# Patient Record
Sex: FEMALE | Race: WHITE | NOT HISPANIC OR LATINO | Employment: FULL TIME | URBAN - METROPOLITAN AREA
[De-identification: names, ages, dates, MRNs, and addresses within clinical notes are randomized per-mention and may not be internally consistent; named-entity substitution may affect disease eponyms.]

---

## 2018-08-27 ENCOUNTER — OFFICE VISIT - RIVER FALLS (OUTPATIENT)
Dept: FAMILY MEDICINE | Facility: CLINIC | Age: 48
End: 2018-08-27

## 2018-11-06 ENCOUNTER — AMBULATORY - RIVER FALLS (OUTPATIENT)
Dept: FAMILY MEDICINE | Facility: CLINIC | Age: 48
End: 2018-11-06

## 2019-11-05 ENCOUNTER — AMBULATORY - RIVER FALLS (OUTPATIENT)
Dept: FAMILY MEDICINE | Facility: CLINIC | Age: 49
End: 2019-11-05

## 2021-05-29 ENCOUNTER — RECORDS - HEALTHEAST (OUTPATIENT)
Dept: ADMINISTRATIVE | Facility: CLINIC | Age: 51
End: 2021-05-29

## 2021-12-16 ENCOUNTER — OFFICE VISIT - RIVER FALLS (OUTPATIENT)
Dept: FAMILY MEDICINE | Facility: CLINIC | Age: 51
End: 2021-12-16

## 2021-12-16 ASSESSMENT — MIFFLIN-ST. JEOR: SCORE: 1377.02

## 2022-02-11 VITALS
SYSTOLIC BLOOD PRESSURE: 135 MMHG | HEART RATE: 73 BPM | BODY MASS INDEX: 29.06 KG/M2 | WEIGHT: 170.2 LBS | DIASTOLIC BLOOD PRESSURE: 91 MMHG | HEIGHT: 64 IN | TEMPERATURE: 97.8 F

## 2022-02-12 VITALS
HEART RATE: 60 BPM | DIASTOLIC BLOOD PRESSURE: 80 MMHG | TEMPERATURE: 98.5 F | BODY MASS INDEX: 27.26 KG/M2 | SYSTOLIC BLOOD PRESSURE: 120 MMHG | WEIGHT: 158.8 LBS

## 2022-02-16 NOTE — PROGRESS NOTES
Chief Complaint    c/o right side sinus pain/pressure x2wks.  also bloody nasal discharge when blowing nose  History of Present Illness       Susan is here with a 2 week h/o right sided nasal and sinus congestion.       No f/c/s       Has h/o sinusitis       She reports ongoing problems with hand eczema and his mother posterior scalp.  She has used various topical steroids in the past.  Review of Systems          ROS reviewed and negative except for symptoms noted in HPI.  Physical Exam   Vitals & Measurements    T: 97.8  F (Tympanic)  HR: 73 (Peripheral)  BP: 135/91     HT: 64 in  WT: 170.2 lb  BMI: 29.21           General:  Alert and oriented, No acute distress.            Eye:  Normal conjunctiva.            HENT:  Normocephalic, Tympanic membranes are clear, Oral mucosa is moist, No pharyngeal erythema.                 Sinus: left maxillary tenderness               Throat: Pharynx ( posterior drainage ).            Neck:  Supple, Non-tender, No lymphadenopathy.            Respiratory:  Lungs are clear to auscultation, Respirations are non-labored.            Cardiovascular:  Normal rate, Regular rhythm.            Integumentary:  Warm, Dry.  Eczematous changes of the palms and posterior neck and scalp          Psychiatric:  Cooperative, Appropriate mood & affect.   Assessment/Plan       1. Acute maxillary sinusitis, unspecified (J01.00)          Symptoms have been persistent over the last 2 weeks.  Treat with amoxicillin clavulanate 875 mg twice daily for 10 days, symptomatic care, follow-up if not improving       2. Chronic eczema of hand (L30.9)          Not improving with current treatment         Clobetasol cream applied twice daily with occlusion at night, follow-up if not improving       3. Eczema of scalp (L30.9)          Clobetasol solution applied twice daily, follow-up if not improving       Orders:         amoxicillin-clavulanate, = 1 tab(s), Oral, q12 hrs, x 10 day(s), # 20 tab(s), 0 Refill(s), Type:  Acute, Pharmacy: [facility], 1 tab(s) Oral q12 hrs,x10 day(s), 64, in, 21 11:50:00 CST, Height Measured, 170.2, lb, 21 11:50:00 CST, Weight Measured, (Ordered)         clobetasol topical, 1 don, Topical, bid, # 50 mL, 1 Refill(s), Type: Maintenance, Pharmacy: [facility], 1 don Topical bid, 64, in, 21 11:50:00 CST, Height Measured, 170.2, lb, 21 11:50:00 CST, Weight Measured, (Ordered)         clobetasol topical, 1 don, Topical, bid, # 45 gm, 1 Refill(s), Type: Maintenance, Pharmacy: [facility], 1 don Topical bid, 64, in, 21 11:50:00 CST, Height Measured, 170.2, lb, 21 11:50:00 CST, Weight Measured, (Ordered)  Patient Information     Name:ANGEL HUTCHINS      Address:      [address] [id number]     Sex:Female     YOB: 1970     [phone]     Emergency Contact:ASTRID HUTCHINS     MRN:[id number]     FIN:[id number]     Location:[facility]     Date of Service:2021      Primary Care Physician:       Hari Gutierrez MD, [phone]      Attending Physician:       Hari Gutierrez MD, [phone]  Problem List/Past Medical History    Ongoing     No qualifying data    Historical     No qualifying data  Procedure/Surgical History     CS -  section     Ectopic pregnancy  Medications    amoxicillin-clavulanate 875 mg-125 mg oral tablet, 1 tab(s), Oral, q12 hrs    clobetasol 0.05% topical cream, 1 don, Topical, bid, 1 refills    clobetasol 0.05% topical solution, 1 don, Topical, bid, 1 refills    hydrocortisone 2.5% topical solution, 1 don, Topical, bid, 1 refills    Multiple Vitamins with Folic Acid and Iron oral tablet, 1 tab(s), Oral, daily  Allergies    No known allergies  Social History    Smoking Status     Former smoker     Alcohol - Current     Electronic Cigarette/Vaping      Electronic Cigarette Use: Never.     Tobacco - Past      Former smoker, quit more than 30 days ago  Family History    High blood  pressure: Mother.  Immunizations          Scheduled Immunizations          Dose Date(s)          tetanus/diphth/pertuss (Tdap) adult/adol          01/13/2011, 01/09/2021          Other Immunizations          influenza          01/13/2011          influenza virus vaccine, inactivated          10/20/2011, 01/08/2013, 11/27/2013, 11/06/2018, 11/05/2019          Td          01/01/1999

## 2022-02-16 NOTE — PROGRESS NOTES
Chief Complaint    pain in foot. pad of left foot. just woke up with pain x 2.5 weeks. rash on neck x 6 month. used hypoalergenic products gotten worse. itch. base of lali.  History of Present Illness      Patient is here with complaints of a pruritic rash and irritation of the posterior scalp and neck just below the hairline.  She has tried different shampoos including medicated shampoos without much success.      She also has pain in her left foot.  Is centered between the first and second metatarsal heads.  She spends most of her time in flip-flops or not padded sandals.  Denies any injury.  She has not changed her activity level.  Review of Systems      See HPI.  All other review of systems negative.  Physical Exam   Vitals & Measurements    T: 98.5   F (Tympanic)  HR: 60(Peripheral)  BP: 120/80       Alert, oriented, no acute distress       Eczematous changes at the posterior hairline and occipital area of the scalp       No obvious deformity of the left foot, tenderness in between the first and second metatarsal heads, normal sensation       X-ray is normal without evidence of fracture dislocation  Assessment/Plan       Eczema of scalp(L30.9)        Hydrocortisone solution 2 and half percent applied twice daily to 3 times daily, follow-up if not improving in the next couple of weeks       Left foot pain(M79.672)        Suspect Sampson's neuroma         Metatarsal pad, supportive shoes, follow-up if not improving over the next couple of weeks         Orders:          XR Foot Min 3 Views Left (Request), Left foot pain       Orders:         hydrocortisone topical, 1 don, TOP, BID, # 30 mL, 1 Refill(s), Type: Maintenance, Pharmacy: [id number], 1 don Topical bid, (Ordered)  Patient Information     Name:ANGEL HUTCHINS      Address:      [address]      Sex:Female      YOB: 1970      Phone:[phone]     MRN:[id number]     FIN:[id number]     Location:[facility]      Date of Service:2018      Primary Care Physician:       Hari Gutierrez MD, [phone]      Attending Physician:       Hari Gutierrez MD, [phone]  Problem List/Past Medical History    Ongoing     No qualifying data    Historical     No qualifying data  Procedure/Surgical History     CS -  section     Ectopic pregnancy  Medications        Multiple Vitamins with Folic Acid and Iron oral tablet: 1 tab(s), PO, Daily, 30 tab(s).        hydrocortisone 2.5% topical solution: 1 don, TOP, BID, 30 mL, 1 Refill(s).                Allergies    No known allergies  Social History    Smoking Status - 2018     Never smoker     Alcohol - Current, 2010     Tobacco - Past, 2010  Family History    High blood pressure: Mother.  Immunizations      Vaccine Date Status      influenza virus vaccine, inactivated 2013 Given      influenza virus vaccine, inactivated 2013 Given      influenza virus vaccine, inactivated 10/20/2011 Given      tetanus/diphth/pertuss (Tdap) adult/adol 2011 Given      influenza 2011 Given      Td 1999 Recorded

## 2022-02-16 NOTE — NURSING NOTE
Comprehensive Intake Entered On:  12/16/2021 11:54 AM CST    Performed On:  12/16/2021 11:50 AM CST by Yamilet Jonas MA               Summary   Chief Complaint :   c/o right side sinus pain/pressure x2wks.  also bloody nasal discharge when blowing nose   Weight Measured :   170.2 lb(Converted to: 170 lb 3 oz, 77.201 kg)    Height Measured :   64 in(Converted to: 5 ft 4 in, 162.56 cm)    Body Mass Index :   29.21 kg/m2 (HI)    Body Surface Area :   1.87 m2   Systolic Blood Pressure :   135 mmHg (HI)    Diastolic Blood Pressure :   91 mmHg (HI)    Mean Arterial Pressure :   106 mmHg   Peripheral Pulse Rate :   73 bpm   BP Site :   Right arm   Pulse Site :   Brachial artery   BP Method :   Electronic   HR Method :   Electronic   Temperature Tympanic :   97.8 DegF(Converted to: 36.6 DegC)  (LOW)    Languages :   English   Yamilet Jonas MA - 12/16/2021 11:50 AM CST   Health Status   Allergies Verified? :   Yes   Medication History Verified? :   Yes   Medical History Verified? :   Yes   Pre-Visit Planning Status :   Completed   Tobacco Use? :   Former smoker   Yamilet Jonas MA - 12/16/2021 11:50 AM CST   Consents   Consent for Immunization Exchange :   Consent Granted   Consent for Immunizations to Providers :   Consent Granted   Yamilet Jonas MA - 12/16/2021 11:50 AM CST   Meds / Allergies   (As Of: 12/16/2021 11:54:53 AM CST)   Allergies (Active)   No known allergies  Estimated Onset Date:   Unspecified ; Created By:   Yamilet Jonas; Reaction Status:   Active ; Category:   Drug ; Substance:   No known allergies ; Type:   Allergy ; Updated By:   Yamilet Jonas; Source:   Patient ; Reviewed Date:   11/2/2015 3:04 PM CST        Medication List   (As Of: 12/16/2021 11:54:53 AM CST)   Prescription/Discharge Order    hydrocortisone topical  :   hydrocortisone topical ; Status:   Prescribed ; Ordered As Mnemonic:   hydrocortisone 2.5% topical solution ; Simple Display Line:   1 don, TOP, BID, 30 mL, 1 Refill(s) ;  Ordering Provider:   Hari Gutierrez MD; Catalog Code:   hydrocortisone topical ; Order Dt/Tm:   8/27/2018 12:39:12 PM CDT            Home Meds    multivitamin with iron  :   multivitamin with iron ; Status:   Documented ; Ordered As Mnemonic:   Multiple Vitamins with Folic Acid and Iron oral tablet ; Simple Display Line:   1 tab(s), PO, Daily, 30 tab(s) ; Catalog Code:   multivitamin with iron ; Order Dt/Tm:   3/1/2010 1:07:00 PM CST            Social History   Social History   (As Of: 12/16/2021 11:54:53 AM CST)   Alcohol:  Current      (Last Updated: 6/23/2010 10:52:53 AM CDT by Marva Sheikh )         Tobacco:  Past      Former smoker, quit more than 30 days ago   (Last Updated: 12/16/2021 11:53:55 AM CST by Yamilet Jonas MA)          Electronic Cigarette/Vaping:        Electronic Cigarette Use: Never.   (Last Updated: 12/16/2021 11:53:51 AM CST by Yamilet Jonas MA)

## 2023-06-01 ENCOUNTER — TRANSFERRED RECORDS (OUTPATIENT)
Dept: MULTI SPECIALTY CLINIC | Facility: CLINIC | Age: 53
End: 2023-06-01

## 2023-06-01 LAB — PAP SMEAR - HIM PATIENT REPORTED: NORMAL

## 2023-08-13 ENCOUNTER — HEALTH MAINTENANCE LETTER (OUTPATIENT)
Age: 53
End: 2023-08-13

## 2024-08-26 ENCOUNTER — OFFICE VISIT (OUTPATIENT)
Dept: FAMILY MEDICINE | Facility: CLINIC | Age: 54
End: 2024-08-26
Payer: COMMERCIAL

## 2024-08-26 VITALS
HEIGHT: 64 IN | SYSTOLIC BLOOD PRESSURE: 130 MMHG | TEMPERATURE: 98.1 F | OXYGEN SATURATION: 98 % | RESPIRATION RATE: 16 BRPM | WEIGHT: 172.1 LBS | DIASTOLIC BLOOD PRESSURE: 84 MMHG | HEART RATE: 70 BPM | BODY MASS INDEX: 29.38 KG/M2

## 2024-08-26 DIAGNOSIS — R21 RASH: Primary | ICD-10-CM

## 2024-08-26 LAB
DEPRECATED S PYO AG THROAT QL EIA: NEGATIVE
GROUP A STREP BY PCR: NOT DETECTED

## 2024-08-26 PROCEDURE — 99213 OFFICE O/P EST LOW 20 MIN: CPT | Performed by: NURSE PRACTITIONER

## 2024-08-26 PROCEDURE — 87651 STREP A DNA AMP PROBE: CPT | Performed by: NURSE PRACTITIONER

## 2024-08-26 RX ORDER — PHENTERMINE HYDROCHLORIDE 15 MG/1
15 CAPSULE ORAL DAILY
COMMUNITY
Start: 2024-03-18

## 2024-08-26 RX ORDER — TRIAMCINOLONE ACETONIDE 1 MG/G
CREAM TOPICAL 2 TIMES DAILY
Qty: 453 G | Refills: 0 | Status: SHIPPED | OUTPATIENT
Start: 2024-08-26

## 2024-08-26 RX ORDER — ESTRADIOL 0.5 MG/1
1 TABLET ORAL
COMMUNITY
Start: 2024-03-18

## 2024-08-26 RX ORDER — PROGESTERONE 100 MG/1
100 CAPSULE ORAL AT BEDTIME
COMMUNITY
Start: 2024-03-18

## 2024-08-26 RX ORDER — CLOBETASOL PROPIONATE 0.5 MG/ML
SOLUTION TOPICAL
COMMUNITY
Start: 2021-12-16 | End: 2024-08-26

## 2024-08-26 NOTE — PATIENT INSTRUCTIONS
Recommend steroid cream as prescribed for itching and inflammation.  May add on over the counter antihistamine such as allegra or zyrtec, for itching if needed.

## 2024-08-26 NOTE — PROGRESS NOTES
"  Assessment & Plan     Rash  Differentials under consideration include guttate psoriasis, viral exanthem.  She has generalized papular rash with mild itching.  Recommend triamcinolone cream twice daily for 2 weeks and sparingly as below.  May also add on close Continues to be.  If symptoms not improving in the next 2 weeks or she has worsening symptoms, recommend follow-up in the clinic.  She denies any exposures, no recent URI symptoms.  No new medications, changes in personal hygiene items or travel.  - Streptococcus A Rapid Screen w/Reflex to PCR - Clinic Collect  - triamcinolone (KENALOG) 0.1 % external cream; Apply topically 2 times daily. X 2 weeks then sparingly as needed          BMI  Estimated body mass index is 29.54 kg/m  as calculated from the following:    Height as of this encounter: 1.626 m (5' 4\").    Weight as of this encounter: 78.1 kg (172 lb 1.6 oz).             Kieth Davis is a 53 year old, presenting for the following health issues:  Derm Problem (Rash all over legs and spreading started yesterday)        8/26/2024    11:26 AM   Additional Questions   Roomed by STAN Turner     Susan is a very pleasant 53-year-old female who presents today for papular rash onset yesterday on her bilateral legs, arms, abdomen, buttocks and a couple lesions on her face.  Lesions are less obvious on her upper extremities, possibly due to sun exposure?  She denies any recent illness, no sore throat.  No fever or chills.  She has tried no over-the-counter topical creams.  She has no history of chickenpox or vaccination for chickenpox.  She is unsure of her immunity but without any recent exposures.  Denies any new medications, detergents, soaps or lotions.  This weekend, she cleaned on Saturday.  Mopped her floors.  Cleaned out closets.  She went to an estate sale walked around but no obvious exposures.  Onset yesterday,       History of Present Illness       Reason for visit:  Rash  Symptom onset:  1-3 days " "ago  Symptoms include:  Rash  Symptom intensity:  Moderate  Symptom progression:  Worsening  Had these symptoms before:  No  What makes it worse:  No  What makes it better:  No   She is taking medications regularly.                 Review of Systems  Constitutional, HEENT, cardiovascular, pulmonary, gi and gu systems are negative, except as otherwise noted.      Objective    /84 (BP Location: Right arm, Patient Position: Sitting, Cuff Size: Adult Regular)   Pulse 70   Temp 98.1  F (36.7  C) (Tympanic)   Resp 16   Ht 1.626 m (5' 4\")   Wt 78.1 kg (172 lb 1.6 oz)   SpO2 98%   BMI 29.54 kg/m    Body mass index is 29.54 kg/m .  Physical Exam  Constitutional:       General: She is not in acute distress.     Appearance: Normal appearance. She is not ill-appearing.   Skin:     General: Skin is warm and dry.      Findings: Rash present. Rash is papular.      Comments: She has a generalized papular rash with hypopigmented halo surrounding papules, with some signs of excoriation on her bilateral upper and lower extremities and trunk.  Most obvious on the lower extremities, abdomen, back and buttocks.   Neurological:      Mental Status: She is alert and oriented to person, place, and time.   Psychiatric:         Mood and Affect: Mood normal.         Behavior: Behavior normal.                    Signed Electronically by: ONEIDA Rodriguez CNP    "

## 2024-10-05 ENCOUNTER — HEALTH MAINTENANCE LETTER (OUTPATIENT)
Age: 54
End: 2024-10-05